# Patient Record
(demographics unavailable — no encounter records)

---

## 2024-12-09 NOTE — HEALTH RISK ASSESSMENT
[Good] : ~his/her~  mood as  good [Yes] : Yes [Monthly or less (1 pt)] : Monthly or less (1 point) [1 or 2 (0 pts)] : 1 or 2 (0 points) [Never (0 pts)] : Never (0 points) [No] : In the past 12 months have you used drugs other than those required for medical reasons? No [No falls in past year] : Patient reported no falls in the past year [0] : 2) Feeling down, depressed, or hopeless: Not at all (0) [PHQ-2 Negative - No further assessment needed] : PHQ-2 Negative - No further assessment needed [PHQ-9 Negative - No further assessment needed] : PHQ-9 Negative - No further assessment needed [I have developed a follow-up plan documented below in the note.] : I have developed a follow-up plan documented below in the note. [Former] : Former [0-4] : 0-4 [Patient reported PAP Smear was normal] : Patient reported PAP Smear was normal [HIV test declined] : HIV test declined [Hepatitis C test declined] : Hepatitis C test declined [Behavioral] : behavioral [With Family] : lives with family [Employed] : employed [High School] : high school [] :  [Sexually Active] : sexually active [Feels Safe at Home] : Feels safe at home [Fully functional (bathing, dressing, toileting, transferring, walking, feeding)] : Fully functional (bathing, dressing, toileting, transferring, walking, feeding) [Fully functional (using the telephone, shopping, preparing meals, housekeeping, doing laundry, using] : Fully functional and needs no help or supervision to perform IADLs (using the telephone, shopping, preparing meals, housekeeping, doing laundry, using transportation, managing medications and managing finances) [Reports changes in vision] : Reports changes in vision [Reports normal functional visual acuity (ie: able to read med bottle)] : Reports normal functional visual acuity [Smoke Detector] : smoke detector [Carbon Monoxide Detector] : carbon monoxide detector [Seat Belt] :  uses seat belt [Sunscreen] : uses sunscreen [With Patient/Caregiver] : , with patient/caregiver [Aggressive treatment] : aggressive treatment [FreeTextEntry1] : tile bone pain PRN , under Chiro care [de-identified] : therapist weekly  [Audit-CScore] : 1 [de-identified] : walking  [de-identified] : healthy  [de-identified] : PHQ9 4 [[id number]] : 0 [de-identified] : 10 , quit 2008 [Change in mental status noted] : No change in mental status noted [Language] : denies difficulty with language [MammogramDate] : NA [PapSmearDate] : 06/24 [FreeTextEntry3] : 4 kids [AdvancecareDate] : 12/24

## 2024-12-09 NOTE — PHYSICAL EXAM
[No Acute Distress] : no acute distress [Well Nourished] : well nourished [Well Developed] : well developed [Well-Appearing] : well-appearing [Normal Sclera/Conjunctiva] : normal sclera/conjunctiva [PERRL] : pupils equal round and reactive to light [Normal Outer Ear/Nose] : the outer ears and nose were normal in appearance [Normal Oropharynx] : the oropharynx was normal [No JVD] : no jugular venous distention [No Lymphadenopathy] : no lymphadenopathy [Supple] : supple [Thyroid Normal, No Nodules] : the thyroid was normal and there were no nodules present [No Respiratory Distress] : no respiratory distress  [No Accessory Muscle Use] : no accessory muscle use [Clear to Auscultation] : lungs were clear to auscultation bilaterally [Normal Rate] : normal rate  [Regular Rhythm] : with a regular rhythm [Normal S1, S2] : normal S1 and S2 [No Murmur] : no murmur heard [No Varicosities] : no varicosities [Pedal Pulses Present] : the pedal pulses are present [No Edema] : there was no peripheral edema [No Extremity Clubbing/Cyanosis] : no extremity clubbing/cyanosis [Normal Appearance] : normal in appearance [No Nipple Discharge] : no nipple discharge [No Axillary Lymphadenopathy] : no axillary lymphadenopathy [Soft] : abdomen soft [Non Tender] : non-tender [Non-distended] : non-distended [No Masses] : no abdominal mass palpated [No HSM] : no HSM [Normal Bowel Sounds] : normal bowel sounds [No CVA Tenderness] : no CVA  tenderness [No Spinal Tenderness] : no spinal tenderness [No Joint Swelling] : no joint swelling [Grossly Normal Strength/Tone] : grossly normal strength/tone [No Rash] : no rash [Coordination Grossly Intact] : coordination grossly intact [No Focal Deficits] : no focal deficits [Normal Gait] : normal gait [Deep Tendon Reflexes (DTR)] : deep tendon reflexes were 2+ and symmetric [Normal Affect] : the affect was normal [Normal Insight/Judgement] : insight and judgment were intact [de-identified] : + tattoo

## 2024-12-09 NOTE — HISTORY OF PRESENT ILLNESS
[FreeTextEntry1] : cc: physical [de-identified] : Patient  presented   for physical. She is doing well, She  denies CP,SOB,Abd pain, no N,V,C,D. Pt is  breast feeding.Pt with HX of PNA, no f/uCXR

## 2025-02-17 NOTE — PLAN
[FreeTextEntry1] : #HCM  -had labs in December no A1c  -vaccinations:      COVID      Influenza      TDAP -depression screen negative  -follow w/ GYN up to date w/  pap smear  -up to date with colonoscopy

## 2025-02-17 NOTE — HISTORY OF PRESENT ILLNESS
[de-identified] : 35 yo F PMH former smoker (quit 2008)  presenting today to establish care.  She follows w/ GYN (Dr. Cordova)

## 2025-02-17 NOTE — HISTORY OF PRESENT ILLNESS
[de-identified] : 33 yo F PMH former smoker (quit 2008)  presenting today to establish care.  She follows w/ GYN (Dr. Cordova)

## 2025-02-18 NOTE — HEALTH RISK ASSESSMENT
[Patient reported PAP Smear was normal] : Patient reported PAP Smear was normal [Employed] : employed [] :  [# Of Children ___] : has [unfilled] children [PapSmearDate] : 04/24

## 2025-02-19 NOTE — HISTORY OF PRESENT ILLNESS
[Stable] : stable [de-identified] : 34 year old female presents for initial evaluation of coccygeal pain since Aug 2023 after the birth of her son.  She notes that the pain is localized.  Denies numbness/tingling. Denies weakness. Sleeping and sitting aggravates the pain. She tried advil and tylenol for the pain. She tried chiropractic care from September til December 2024, and also tried daily lumbar HEP. She overall did not feel improvement. Denies HEAVEN. PMHx: denies significant medical history. She is a SAHM.  No fever, chills, sweats, nausea/vomiting. No bowel or bladder dysfunction, no recent weight loss or gain. No night pain. This history is in addition to the intake form that I personally reviewed.

## 2025-02-19 NOTE — DISCUSSION/SUMMARY
[de-identified] : Coccydynia. Tried Chiropractic care and medication with no relief. Discussed all options. Diclofenac PRN. Pelvic MRI referral. F/U after MRI. All options discussed including rest, medicine, home exercise, acupuncture, Chiropractic care, Physical Therapy, Pain management, and last resort surgery. All questions were answered, all alternatives discussed, and the patient is in complete agreement with the treatment plan which the patient contributed to and discussed with me through the shared decision-making process. Follow-up appointment as instructed. Any issues and the patient will call or come in sooner.

## 2025-02-19 NOTE — ADDENDUM
[FreeTextEntry1] : This note was written by Fausto Joseph on 02/19/2025 acting as scribe for Dr. Tony Manjarrez M.D.  I, Tony Manjarrez MD, have read and attest that all the information, medical decision making and discharge instructions within are true and accurate.

## 2025-02-19 NOTE — PHYSICAL EXAM
[Normal] : Gait: normal [Flores's Sign] : negative Flores's sign [Pronator Drift] : negative pronator drift [SLR] : negative straight leg raise [de-identified] : 5 out of 5 motor strength, sensation is intact and symmetrical full range of motion flexion extension and rotation, no palpatory tenderness full range of motion of hips knees shoulders and elbows (all four extremities), no atrophy, negative straight leg raise, no pathological reflexes, no swelling, normal ambulation, no apparent distress skin is intact, no palpable lymph nodes, no upper or lower extremity instability, alert and oriented x3 and normal mood. Normal finger-to nose test.  No upper motor neuron findings. Pain over the sacrococcygeal ligament region. [de-identified] : XR Sacrum/Coccyx 2/19/25 -adequate- reviewed with patient.   XR LS SPINE AP LAT 2-3 VIEWS ORDERED BY: LEONARDO RENDON  PROCEDURE DATE: 10/11/2024    INTERPRETATION: Radiographs of the lumbar spine CPT 03127  CLINICAL INFORMATION: Low back pain of unknown severity or duration.  TECHNIQUE: Frontal and lateral views of the lumbar spine and lateral coned-down view of the lumbosacral junction were obtained.  FINDINGS: No previous examinations are available for review.  No gross vertebral body fracture is demonstrated. Minimal degenerative changes with endplate sclerosis are noted at the L1-L2 level. Lordosis is maintained. Disc spaces are maintained. The sacroiliac joints appear symmetric bilaterally. Mild diffuse osteopenia of the visualized osseous structures is noted.  IMPRESSION: Minimal degenerative changes noted in the lumbar spine as noted above. Mild diffuse osteopenia noted.  --- End of Report ---

## 2025-03-05 NOTE — HISTORY OF PRESENT ILLNESS
[Stable] : stable [de-identified] : 34 year old female presents for initial evaluation of coccygeal pain since Aug 2023 after the birth of her son.  She notes that the pain is localized.  Denies numbness/tingling. Denies weakness. Sleeping and sitting aggravates the pain. She tried advil and tylenol for the pain. She tried chiropractic care from September til December 2024, and also tried daily lumbar HEP. She overall did not feel improvement. Denies HEAVEN. Presents today for MRI Pelvis review. PMHx: denies significant medical history. She is a SAHM.  No fever, chills, sweats, nausea/vomiting. No bowel or bladder dysfunction, no recent weight loss or gain. No night pain. This history is in addition to the intake form that I personally reviewed.

## 2025-03-05 NOTE — DISCUSSION/SUMMARY
[de-identified] : Coccydynia. Increased sacral edema. Breast feeding. Discussed all options. MDP- see OB/GYN before taking as she states she periodically breast-feeds. Donut Pillow. All options discussed including rest, medicine, home exercise, acupuncture, Chiropractic care, Physical Therapy, Pain management, and last resort surgery. All questions were answered, all alternatives discussed, and the patient is in complete agreement with the treatment plan which the patient contributed to and discussed with me through the shared decision-making process. Follow-up appointment as instructed. Any issues and the patient will call or come in sooner.

## 2025-03-05 NOTE — PHYSICAL EXAM
[Normal] : Gait: normal [Flores's Sign] : negative Flores's sign [Pronator Drift] : negative pronator drift [SLR] : negative straight leg raise [de-identified] : 5 out of 5 motor strength, sensation is intact and symmetrical full range of motion flexion extension and rotation, no palpatory tenderness full range of motion of hips knees shoulders and elbows (all four extremities), no atrophy, negative straight leg raise, no pathological reflexes, no swelling, normal ambulation, no apparent distress skin is intact, no palpable lymph nodes, no upper or lower extremity instability, alert and oriented x3 and normal mood. Normal finger-to nose test.  No upper motor neuron findings. Pain over the sacrococcygeal ligament region. [de-identified] : I reviewed, interpreted and clinically correlated the following outside imaging studies,  MR PELVIS BONY ONLY   ORDERED BY:  YANET TAVAREZ  PROCEDURE DATE:  2025    INTERPRETATION:  CLINICAL INDICATION: Coccydynia since 2023.  COMPARISON: Sacrum/coccyx radiographs 5/3/2024. Lumbar spine radiographs 10/11/2024.  TECHNIQUE: Multisequence, multiplanar MRI of the pelvis was performed without the use of intravenous or intra-articular contrast.  FINDINGS:  Right Hip: Normal within limits of current study.  Left Hip: Normal within limits of current study.  Bursa: No focal bursal fluid collection.  Marrow: No fracture or osteonecrosis.  Tendons: Normal within limits of current study.  Muscles: No focal muscle edema, atrophy, or fatty infiltration.  Sacroiliac Joints: Normal.  Lower Lumbar Spine: Normal.  Sacrum: Normal.  Coccyx: Chronic remodeling of the inferior margin of the second coccygeal segment, which is well marginated with a mild amount of marrow edema. The third coccygeal segment is displaced anteriorly and superiorly and shows diffuse marrow edema (3:17-18). A portion of the attachment of the levator ani muscle group and the anococcygeal ligament appears to insert on the displaced coccygeal segment (4:12-17). The more cranial portion of the coccyx is normal.  Intraabdominal/Intrapelvic Structures: Multiparous appearance of the uterus. Scarring in the anterior wall of the uterus with suspected associated hypertrophic change, which is likely postsurgical in the setting of multiple prior  sections.  Miscellaneous: A mildly prominent vessel is seen presacral space.  IMPRESSION: 1.  Anteriorly and superiorly displaced distal coccygeal segment which shows increased marrow edema, as described above. 2.  Scarring in the anterior wall of the uterus with suspected associated hypertrophic change, which is likely postsurgical in the setting of multiple prior  sections. Consider correlation with priors to ensure stability. If no prior imaging of the uterus exists, a targeted ultrasound of the uterus may be performed for further evaluation.  --- End of Report ---     XR Sacrum/Coccyx 25 -adequate- reviewed with patient.   XR LS SPINE AP LAT 2-3 VIEWS ORDERED BY: LEONARDO RENDON  PROCEDURE DATE: 10/11/2024    INTERPRETATION: Radiographs of the lumbar spine CPT 79818  CLINICAL INFORMATION: Low back pain of unknown severity or duration.  TECHNIQUE: Frontal and lateral views of the lumbar spine and lateral coned-down view of the lumbosacral junction were obtained.  FINDINGS: No previous examinations are available for review.  No gross vertebral body fracture is demonstrated. Minimal degenerative changes with endplate sclerosis are noted at the L1-L2 level. Lordosis is maintained. Disc spaces are maintained. The sacroiliac joints appear symmetric bilaterally. Mild diffuse osteopenia of the visualized osseous structures is noted.  IMPRESSION: Minimal degenerative changes noted in the lumbar spine as noted above. Mild diffuse osteopenia noted.  --- End of Report ---

## 2025-03-05 NOTE — ADDENDUM
[FreeTextEntry1] : This note was written by Fausto Joseph on 03/05/2025 acting as scribe for Dr. Tony Manjarrez M.D.  I, Tony Manjarrez MD, have read and attest that all the information, medical decision making and discharge instructions within are true and accurate.

## 2025-06-04 NOTE — HISTORY OF PRESENT ILLNESS
[FreeTextEntry1] : 33 yo here today for annual----has dx of coccodynia, considering steroid injection

## 2025-06-04 NOTE — PHYSICAL EXAM
[MA] : MA [FreeTextEntry2] : JS [Appropriately responsive] : appropriately responsive [Alert] : alert [No Acute Distress] : no acute distress [No Lymphadenopathy] : no lymphadenopathy [Soft] : soft [Non-tender] : non-tender [Non-distended] : non-distended [No HSM] : No HSM [No Lesions] : no lesions [No Mass] : no mass [Oriented x3] : oriented x3 [Examination Of The Breasts] : a normal appearance [No Masses] : no breast masses were palpable [Labia Majora] : normal [Labia Minora] : normal [Normal] : normal [Uterine Adnexae] : normal

## 2025-07-22 NOTE — HEALTH RISK ASSESSMENT
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

## 2025-07-22 NOTE — PLAN
[FreeTextEntry1] : #Establish care/HCM -discussed medical history  -appreciated labs done in December (CBC, CMP, TSH, Lipid profile wnl)  -vaccinations:      COVID : not vaccinated     TDAP : up to date  -depression screen negative  -follow w/ GYN up to date w/ pap smear -advised to f/u with Derm for skin cancer screening -had PNA last year, feels well now, discussed repeat imaging, declines

## 2025-07-22 NOTE — ASSESSMENT
[Vaccines Reviewed] : Immunizations reviewed today. Please see immunization details in the vaccine log within the immunization flowsheet.  [FreeTextEntry1] : 33 yo F PMH jud, former smoker (quit 2008, 2 years) presenting today to establish care.

## 2025-07-22 NOTE — HEALTH RISK ASSESSMENT
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

## 2025-07-22 NOTE — ASSESSMENT
[Vaccines Reviewed] : Immunizations reviewed today. Please see immunization details in the vaccine log within the immunization flowsheet.  [FreeTextEntry1] : 35 yo F PMH jud, former smoker (quit 2008, 2 years) presenting today to establish care.

## 2025-07-22 NOTE — HISTORY OF PRESENT ILLNESS
[FreeTextEntry1] : follow-up   [de-identified] : 35 yo F PMH ujd, former smoker (quit 2008, 2 years) presenting today to [facility] care.   She last saw a PCP (Dr. Vera) for a CPE in December.   She follows w/ GYN (Dr. Cordova), Derm (Dr. WYLIE).   Denies any complaints today.

## 2025-07-22 NOTE — PHYSICAL EXAM
[Supple] : supple [Non Tender] : non-tender [Coordination Grossly Intact] : coordination grossly intact [Normal] : affect was normal and insight and judgment were intact

## 2025-07-22 NOTE — HISTORY OF PRESENT ILLNESS
[FreeTextEntry1] : follow-up   [de-identified] : 35 yo F PMH jud, former smoker (quit 2008, 2 years) presenting today to [facility] care.   She last saw a PCP (Dr. Vera) for a CPE in December.   She follows w/ GYN (Dr. Cordova), Derm (Dr. WYLIE).   Denies any complaints today.